# Patient Record
(demographics unavailable — no encounter records)

---

## 2025-01-10 NOTE — REASON FOR VISIT
[Initial] : an initial visit [Sleep Evaluation] : sleep evaluation [TextEntry] : Patient states she snores at night and is here to be evaluated for sleep apnea.

## 2025-01-10 NOTE — DISCUSSION/SUMMARY
[FreeTextEntry1] : Ms. Castillo has severe snoring.  Her  has to sleep in another room.  She has no definite hypersomnolence.  She has a narrow pharynx and certainly could have mild obstructive sleep apnea.  I favor a home sleep study she understands and agrees.  Once these results are available further recommendations will be made. The patient understands and agrees with plan of care. Today's office visit encompassed 46 minutes. I conducted an extensive history,physical exam and reviewed diagnosis and treatment options including diagnostic tests,radiology studies including cat scans and the use of prescription medication.

## 2025-01-10 NOTE — PHYSICAL EXAM
[No Acute Distress] : no acute distress [Normal Oropharynx] : normal oropharynx [Normal Appearance] : normal appearance [No Neck Mass] : no neck mass [Normal Rate/Rhythm] : normal rate/rhythm [Normal S1, S2] : normal s1, s2 [No Murmurs] : no murmurs [No Resp Distress] : no resp distress [Clear to Auscultation Bilaterally] : clear to auscultation bilaterally [No Abnormalities] : no abnormalities [Benign] : benign [Normal Gait] : normal gait [No Clubbing] : no clubbing [No Cyanosis] : no cyanosis [No Edema] : no edema [FROM] : FROM [Normal Color/ Pigmentation] : normal color/ pigmentation [No Focal Deficits] : no focal deficits [Oriented x3] : oriented x3 [Normal Affect] : normal affect [TextBox_11] : Narrow pharynx

## 2025-01-10 NOTE — PROCEDURE
[FreeTextEntry1] : Pulmonary function test 1/10/2025 no obstructive restrictive lung disease.  Mild decreased diffusion capacity of unclear significance.

## 2025-01-10 NOTE — HISTORY OF PRESENT ILLNESS
[Never] : never [TextBox_4] : 43 female no hx tobacco  co severe snoring no thrashing no witness apnea  no am dry mouth or headache  feels refreshed in am  no hypersomnolence no nap during day  sleeps another room occ pharmacy  mfgr no chemical no asbestos

## 2025-02-07 NOTE — PROCEDURE
[FreeTextEntry1] : Home sleep study performed 1/22/2025 AHI 1.2. There were 38 total disturbances. O2 sat micki 86%. No evidence of obstructive sleep apnea. Will discuss with patient next visit possible over-the-counter oral appliance for snoring

## 2025-02-07 NOTE — HISTORY OF PRESENT ILLNESS
[Never] : never [TextBox_4] : 43 female  ++snore  no witness apnea  no thrashing no am headache or dry mouth feels refreshed in am  sleep 8 h per day  no hypersomnolence

## 2025-02-07 NOTE — DISCUSSION/SUMMARY
[FreeTextEntry1] : Ms. Castillo has severe snoring but no evidence of obstructive sleep apnea.  Upon questioning once again she denies all significant symptoms of sleep apnea except for snoring.  I did discuss with her that home sleep studies can miss mild obstructive apnea but since she has no symptoms there is little evidence that a in lab study will be useful.  I did discuss this with the sleep physician at [facility] and an over-the-counter appliance is the next step.  I discussed with her trial of an over-the-counter appliance for snoring.  I also recommend she call one of the sleep dentist to asked them for recommendations on an over-the-counter appliance as 1 may day them will be quite costly and not covered by insurance.  She will try obtaining his oral appliance and then seeing which one off is the best results.  She will return to the office as needed. The patient understands and agrees with plan of care. Today's office visit encompassed 32 minutes which excludes teaching and separately reported services.. I conducted an extensive history, physical exam and reviewed diagnosis and treatment options including diagnostic tests,radiology studies including cat scans and the use of prescription medication.

## 2025-02-07 NOTE — REASON FOR VISIT
[Follow-Up] : a follow-up visit [TextEntry] : Pt here for `1 month follow-up to review @ home sleep study results.